# Patient Record
Sex: FEMALE | Race: WHITE
[De-identification: names, ages, dates, MRNs, and addresses within clinical notes are randomized per-mention and may not be internally consistent; named-entity substitution may affect disease eponyms.]

---

## 2019-01-31 ENCOUNTER — HOSPITAL ENCOUNTER (EMERGENCY)
Dept: HOSPITAL 89 - ER | Age: 29
Discharge: HOME | End: 2019-01-31
Payer: COMMERCIAL

## 2019-01-31 VITALS — DIASTOLIC BLOOD PRESSURE: 65 MMHG | SYSTOLIC BLOOD PRESSURE: 103 MMHG

## 2019-01-31 DIAGNOSIS — G43.909: ICD-10-CM

## 2019-01-31 DIAGNOSIS — S06.0X0A: Primary | ICD-10-CM

## 2019-01-31 DIAGNOSIS — M54.5: ICD-10-CM

## 2019-01-31 PROCEDURE — 72072 X-RAY EXAM THORAC SPINE 3VWS: CPT

## 2019-01-31 PROCEDURE — 72120 X-RAY BEND ONLY L-S SPINE: CPT

## 2019-01-31 PROCEDURE — 99284 EMERGENCY DEPT VISIT MOD MDM: CPT

## 2019-01-31 PROCEDURE — 70450 CT HEAD/BRAIN W/O DYE: CPT

## 2019-01-31 NOTE — RADIOLOGY IMAGING REPORT
FACILITY: [facility] [place] 

 

PATIENT NAME: Abida Headley

: 1990

MR: [id number]

V: [id number]

EXAM DATE: 

ORDERING PHYSICIAN: JD GALVEZ

TECHNOLOGIST: 

 

Location: [facility]

Patient: Abida Headley

: 1990

MRN: [id number]

Visit/Account:[id number]

Date of Sevice:  2019

 

ACCESSION #: [id number]

 

EXAMINATION:   Head CT without intravenous contrast

 

HISTORY:  Slipped on ice and hit head on Saturday. Posterior skull pain and headache. Light sensitivi
ty. Nausea.

 

COMPARISON:  None.

 

TECHNIQUE:   Contiguous axial images were obtained from the skull base to the vertex without intraven
ous contrast.  Sagittal and coronal reformatted images are also submitted.

 

One of the following dose optimization techniques was utilized in the performance of this exam: Autom
ated exposure control; adjustment of the mA and/or kV according to the patient's size; or use of an i
terative  reconstruction technique.  Specific details can be referenced in the facility's radiology C
T exam operational policy.

 

FINDINGS:

 

Brain and intracranial structures:  Ventricles and sulci are normal in size. The basal cisterns are p
atent. Gray-white matter differentiation is maintained.

 

No midline shift, acute hemorrhage, acute infarct, or mass.

 

Calvarium / scalp:  Negative. No acute fracture.

 

Skull base / visualized face:  Negative.

 

Visualized sinuses / orbits:  Negative.

 

IMPRESSION:

No acute intracranial abnormality.

 

Report Dictated By: Julian Renteria MD at 2019 4:17 PM

 

Report E-Signed By: Julian Renteria MD  at 2019 4:25 PM

 

WSN:[id number]

## 2019-01-31 NOTE — RADIOLOGY IMAGING REPORT
FACILITY: [facility] 

 

PATIENT NAME: Abida Headley

: 1990

MR: [id number]

V: [id number]

EXAM DATE: 

ORDERING PHYSICIAN: JD GALVEZ

TECHNOLOGIST: 

 

Location: [facility]

Patient: Abida Headley

: 1990

MRN: [id number]

Visit/Account:[id number]

Date of Sevice:  2019

 

ACCESSION #: [id number]

 

L-SPINE >4 VIEWS

 

INDICATION: Back pain after fall on ice.

 

COMPARISON: None available

 

FINDINGS:   4 views of the lumbar spine. There are 5 nonrib-bearing lumbar vertebral bodies. The vert
ebral bodies are aligned. No compression fracture, bone lesion or spondylolysis. No degenerative worrell
ges. The endplates are maintained. Pedicles well seen. Soft tissues are unremarkable. IUD seen in the
 pelvis.

 

IMPRESSION: Unremarkable exam. No acute abnormality.

 

Report Dictated By: Omkar Wen at 2019 4:10 PM

 

Report E-Signed By: Omkar Wen  at 2019 4:12 PM

 

WSN:[id number]

## 2019-01-31 NOTE — ER REPORT
History and Physical


Time Seen By MD:  15:47


Hx. of Stated Complaint:  


headache, lower back pain


HPI/ROS


CHIEF COMPLAINT: headache and back pain





HISTORY OF PRESENT ILLNESS: Pt on saturday slipped on ice and landed on her back


and hit her head on the ground. Was witnessed by her . Pt had no loc per 


  but does not recall the event.  Pt has had nausuea, fatigue, dizziness 


since it occurred. PT also has pain in lower back since it occurred. No trouble 


with bowel or bladder. Pt using ibuprofen 600mg 4 x a day for discomfort. Pt has


a remote hx of migraines and states that today the symptoms returned. + 


photophobia with her headache. Came her to be checked.back pain does radiate to 


r hip but not down leg. 





REVIEW OF SYSTEMS:


Constitutional: No fever, no chills.


Eyes: No discharge, + photophobia


ENT: No sore throat. 


Cardiovascular: No chest pain, no palpitations.


Respiratory: No cough, no shortness of breath.


Gastrointestinal: No abdominal pain,+ nausea,  no vomiting.


Genitourinary: No hematuria.


Musculoskeletal: + back pain.


Skin: No rashes.


Neurological: + headache


Allergies:  


Coded Allergies:  


     No Known Allergies (Verified  Allergy, Unknown, 1/31/19)


Past Medical/Surgical History


Pmhx: insomnia, migraines


Pshx: shoulder


Reviewed Nurses Notes:  Yes


Hx Alcohol Use:  No


Constitutional





Vital Sign - Last 24 Hours








 1/31/19 1/31/19 1/31/19 1/31/19





 15:33 15:38 15:42 15:45


 


Temp   98.4 


 


Pulse 68  77 


 


Resp   18 


 


B/P (MAP)  118/75 (89) 118/75 112/55 (74)


 


Pulse Ox 95  99 


 


O2 Delivery Room Air  Room Air 


 


    





 1/31/19 1/31/19  





 16:03 16:15  


 


Pulse 65   


 


B/P (MAP)  108/63 (78)  


 


Pulse Ox 96   


 


O2 Delivery Room Air   








Physical Exam


General Appearance: The patient is alert, has no immediate need for airway 


protection and no signs of toxicity.


Eyes: Pupils equal and round no pallor or injection, EOMI, nl fundus exam


ENT:  no pharyngeal erythema or exudates, Mucous membranes are moist, TM are nl 


b/l, neg hemotympanums


Respiratory: There are no retractions, lungs are clear to auscultation.


Cardiovascular: Regular rate and rhythm. pulses are equal and symmetrical


Gastrointestinal:  Abdomen is soft and non tender, no masses, bowel sounds 


normal, no guarding, no rigidity or rebound


Neurological: Cranial nerves II-XII grossly intact, no sensory or motor loss


Skin: Warm and dry, no rashes.


Musculoskeletal: Neck is supple non tender, no vertebral tenderness


Extremities are nontender, nonswollen and have full range of motion.








DIFFERENTIAL DIAGNOSIS: After history and physical exam differential diagnosis 


was considered for concussion, migraines, closed head injury, intracranial 


bleed, back contusion, compression fx, lumbar radiculopathy





Medical Decision Making


ED Course/Re-evaluation


ED Course


CT head and image spine





PT refused pregnancy test stating she has IUD and will sign a waiver. 





 01/31/2019 5:02:11 pm Spoke with pt at length about her imaging results.  Pt 


initiallly refused pain medication but now would like something for her 


headache. Did offer IV for hydration and medication but prefers oral and then to


go home.  Will give a lortab. Will also give off of school tomorrow so she can 


rest.


Decision to Disposition Date:  Jan 31, 2019


Decision to Disposition Time:  17:03





Depart


Departure


Latest Vital Signs





Vital Signs








  Date Time  Temp Pulse Resp B/P (MAP) Pulse Ox O2 Delivery O2 Flow Rate FiO2


 


1/31/19 16:15    108/63 (78)    


 


1/31/19 16:03  65   96 Room Air  


 


1/31/19 15:42 98.4  18     








Impression:  


   Primary Impression:  


   Concussion


   Additional Impressions:  


   LOW BACK PAIN


   Migraine headache


Condition:  Condition Unchanged


Disposition:  HOME OR SELF-CARE


Departure Forms:  ER Transition Record, Medications Reconciliation,    Off 


Work/School Form,    School or Work Release?:  School


   Number of days to be released:  2


Patient Portal Information


Patient Instructions:  Concussion (ED)





Additional Instructions:  


Your cat scan and plain films did not show an acute injury.


You do have a concussion. 


It is important to rest.  TV,  computers, reading can all worsen your headache. 








Motrin (advil, ibuprofen) 600mg every 8 hours as needed for headache. 


Hydrocodone with tylenol (script) one every 4 hours as needed for headache.





Problem Qualifiers








   Primary Impression:  


   Concussion


   Encounter type:  initial encounter  Loss of consciousness presence/duration: 


   without LOC  Qualified Codes:  S06.0X0A - Concussion without loss of 


   consciousness, initial encounter


   Additional Impressions:  


   Migraine headache


   Migraine type:  unspecified  Status migrainosus presence:  without status 


   migrainosus  Intractability:  not intractable  Qualified Codes:  G43.909 - 


   Migraine, unspecified, not intractable, without status migrainosus








JD GALVEZ DO            Jan 31, 2019 15:53

## 2019-01-31 NOTE — RADIOLOGY IMAGING REPORT
FACILITY: [facility] [place] 

 

PATIENT NAME: Abida Headley

: 1990

MR: [id number]

V: [id number]

EXAM DATE: 

ORDERING PHYSICIAN: JD GALVEZ

TECHNOLOGIST: 

 

Location: [facility]

Patient: Abida Headley

: 1990

MRN: [id number]

Visit/Account:[id number]

Date of Sevice:  2019

 

ACCESSION #: [id number]

 

XR THORACIC SPINE 3 V

 

COMPARISONS: None.

 

ADDITIONAL PERTINENT HISTORY: Fall onto back

 

FINDINGS:

 

Vertebral body heights and alignment: Minimal scoliotic curvature convex to the right centered at T7.


 

Vertebral bodies: Negative.

 

Disc spaces: Negative.

 

Cervicothoracic junction: Negative.

 

Surrounding soft tissues: Negative.

 

IMPRESSION:

1. Minimal scoliotic curvature convex to the right centered at T7.

2. No acute appearing bony abnormalities.

 

Report Dictated By: Preston Martin MD at 2019 4:10 PM

 

Report E-Signed By: Preston Martin MD  at 2019 4:11 PM

 

WSN:[id number]